# Patient Record
Sex: FEMALE | Race: WHITE | NOT HISPANIC OR LATINO | URBAN - METROPOLITAN AREA
[De-identification: names, ages, dates, MRNs, and addresses within clinical notes are randomized per-mention and may not be internally consistent; named-entity substitution may affect disease eponyms.]

---

## 2022-07-22 ENCOUNTER — HOSPITAL ENCOUNTER (EMERGENCY)
Facility: CLINIC | Age: 52
Discharge: LEFT BEFORE DISCHARGE | End: 2022-07-22

## 2022-07-23 NOTE — ED NOTES
"Patient would like to this nurse to stop triage at this point, states: \"the pain is actually subsiding, so Im going to go home and come back tomorrow\"          "